# Patient Record
Sex: MALE | Race: WHITE | URBAN - METROPOLITAN AREA
[De-identification: names, ages, dates, MRNs, and addresses within clinical notes are randomized per-mention and may not be internally consistent; named-entity substitution may affect disease eponyms.]

---

## 2021-10-07 ENCOUNTER — OFFICE VISIT (OUTPATIENT)
Dept: URBAN - METROPOLITAN AREA CLINIC 75 | Facility: CLINIC | Age: 71
End: 2021-10-07
Payer: COMMERCIAL

## 2021-10-07 DIAGNOSIS — H25.13 AGE-RELATED NUCLEAR CATARACT, BILATERAL: ICD-10-CM

## 2021-10-07 DIAGNOSIS — H43.811 VITREOUS DEGENERATION, RIGHT EYE: ICD-10-CM

## 2021-10-07 PROCEDURE — 92004 COMPRE OPH EXAM NEW PT 1/>: CPT | Performed by: OPTOMETRIST

## 2021-10-07 PROCEDURE — 92310 CONTACT LENS FITTING OU: CPT | Performed by: OPTOMETRIST

## 2021-10-07 RX ORDER — CYCLOSPORINE/CHONDROITIN SULFATE PF 1 MG/ML
EMULSION OPHTHALMIC
Qty: 16.5 | Refills: 3 | Status: ACTIVE
Start: 2021-10-07

## 2021-10-07 ASSESSMENT — VISUAL ACUITY
OD: 20/20
OS: 20/20

## 2021-10-07 ASSESSMENT — KERATOMETRY
OD: 44.13
OS: 44.25

## 2021-10-07 ASSESSMENT — INTRAOCULAR PRESSURE
OS: 10
OD: 13

## 2021-10-07 NOTE — IMPRESSION/PLAN
Impression: Vitreous degeneration, right eye: H43.811. Plan: Reviewed the signs and symptoms of a retinal tear and detachment in detail with the patient, including worsening flashes, new floaters, and development of a shadow/curtain in the peripheral visual field. The patient was advised to call immediately with any changes to [place] or increase in symptoms.

## 2021-10-07 NOTE — IMPRESSION/PLAN
Impression: Presbyopia: H52.4- HX of RGP wearer. Refraction completed. Previous RGP & chart notes scanned in under [facility] [place]. Plan: RGP Notes. OD: lid attached fit, mild pooling apical alinement, moderate edge clearance OR OD: +0.25 sph OS: Lid attached fit alined apically, mild bearing in mid perif, edge can be tightened 1 step. OR OS: -0.25 sph *Steepen peripheral curve by . 5.

## 2021-10-29 ENCOUNTER — OFFICE VISIT (OUTPATIENT)
Dept: URBAN - METROPOLITAN AREA CLINIC 75 | Facility: CLINIC | Age: 71
End: 2021-10-29

## 2021-10-29 DIAGNOSIS — H52.4 PRESBYOPIA: Primary | ICD-10-CM

## 2021-10-29 PROCEDURE — 92310 CONTACT LENS FITTING OU: CPT | Performed by: OPTOMETRIST

## 2021-10-29 NOTE — IMPRESSION/PLAN
Impression: Presbyopia: H52.4- HX of RGP wearer. Previous RGP & chart notes scanned in under [address] [place] [facility]. Plan: RGP Notes. OU: apical alinement, mid perif mild bearing, mild edge lift. VA: 20/20 OU

## 2022-08-31 ENCOUNTER — OFFICE VISIT (OUTPATIENT)
Dept: URBAN - METROPOLITAN AREA CLINIC 71 | Facility: CLINIC | Age: 72
End: 2022-08-31
Payer: MEDICARE

## 2022-08-31 DIAGNOSIS — H43.811 VITREOUS DEGENERATION, RIGHT EYE: ICD-10-CM

## 2022-08-31 DIAGNOSIS — E11.9 TYPE 2 DIABETES MELLITUS WITHOUT COMPLICATIONS: Primary | ICD-10-CM

## 2022-08-31 DIAGNOSIS — H25.13 AGE-RELATED NUCLEAR CATARACT, BILATERAL: ICD-10-CM

## 2022-08-31 PROCEDURE — 99203 OFFICE O/P NEW LOW 30 MIN: CPT | Performed by: OPHTHALMOLOGY

## 2022-08-31 ASSESSMENT — INTRAOCULAR PRESSURE
OD: 13
OS: 13

## 2022-08-31 NOTE — IMPRESSION/PLAN
Impression: Type 2 diabetes mellitus without complications: Y49.7. The clinical exam is consistent with Type 2 DM without retinopathy. The patient was advised to maintain tight blood sugar, blood pressure, and lipid control. Plan: Keep appointments as scheduled with PCP for diabetic management. Return annually for dilated exams.

## 2022-08-31 NOTE — IMPRESSION/PLAN
Impression: Vitreous degeneration, right eye: H43.811. No holes or tears seen today. Stable. Plan: Monitor for changes.

## 2024-07-05 ENCOUNTER — OFFICE VISIT (OUTPATIENT)
Dept: URBAN - METROPOLITAN AREA CLINIC 71 | Facility: CLINIC | Age: 74
End: 2024-07-05
Payer: MEDICARE

## 2024-07-05 DIAGNOSIS — H43.813 VITREOUS DEGENERATION, BILATERAL: ICD-10-CM

## 2024-07-05 DIAGNOSIS — E11.9 TYPE 2 DIABETES MELLITUS WITHOUT COMPLICATIONS: Primary | ICD-10-CM

## 2024-07-05 PROCEDURE — 99213 OFFICE O/P EST LOW 20 MIN: CPT | Performed by: OPHTHALMOLOGY

## 2024-07-05 RX ORDER — METFORMIN HYDROCHLORIDE 500 MG/1
500 MG TABLET, FILM COATED ORAL
Qty: 0 | Refills: 0 | Status: ACTIVE
Start: 2024-07-05

## 2024-07-05 ASSESSMENT — INTRAOCULAR PRESSURE
OD: 13
OS: 14

## 2025-07-08 ENCOUNTER — OFFICE VISIT (OUTPATIENT)
Dept: URBAN - METROPOLITAN AREA CLINIC 71 | Facility: CLINIC | Age: 75
End: 2025-07-08
Payer: MEDICARE

## 2025-07-08 DIAGNOSIS — H43.813 VITREOUS DEGENERATION, BILATERAL: ICD-10-CM

## 2025-07-08 DIAGNOSIS — H25.13 AGE-RELATED NUCLEAR CATARACT, BILATERAL: ICD-10-CM

## 2025-07-08 DIAGNOSIS — E11.9 TYPE 2 DIABETES MELLITUS WITHOUT COMPLICATIONS: Primary | ICD-10-CM

## 2025-07-08 PROCEDURE — 99213 OFFICE O/P EST LOW 20 MIN: CPT | Performed by: OPHTHALMOLOGY

## 2025-07-08 ASSESSMENT — INTRAOCULAR PRESSURE
OS: 13
OD: 15